# Patient Record
Sex: FEMALE | Race: BLACK OR AFRICAN AMERICAN
[De-identification: names, ages, dates, MRNs, and addresses within clinical notes are randomized per-mention and may not be internally consistent; named-entity substitution may affect disease eponyms.]

---

## 2017-01-17 NOTE — PHYS DOC
Past Medical History


Past Medical History:  Anemia


Past Surgical History:  No Surgical History


Alcohol Use:  None


Drug Use:  None





Adult General


Chief Complaint


Chief Complaint:  DIZZY/LIGHT HEADED





HPI


HPI


Patient is a 21  year old female with a history of anemia who presents today 

with dizziness especially on changing position from laying down to standing 

that began 3 days ago. Patient denies any nausea vomiting, denies any chance 

she is pregnant, she states she uses Depakote shots for birth control and she 

had her last shot 1 month ago. Patient denies any abdominal pain. Denies any 

headache. Denies any fever coughing or congestion.





Review of Systems


Review of Systems





Constitutional: See history of present illness


Eyes: Denies change in visual acuity, redness, or eye pain []


HENT: Denies nasal congestion or sore throat []


Respiratory: Denies cough or shortness of breath []


Cardiovascular: No additional information not addressed in HPI []


GI: Denies any nausea vomiting or diarrhea


Integument: Denies rash or skin lesions []


Neurologic: Dizziness


Endocrine: Denies polyuria or polydipsia []





Current Medications


Current Medications








 Current Medications








 Medications


  (Trade)  Dose


 Ordered  Sig/Andrés  Start Time


 Stop Time Status Last Admin


Dose Admin


 


 Meclizine HCl


  (Antivert)  12.5 mg  1X  ONCE  1/18/17 00:00


 1/18/17 00:01 DC 1/18/17 00:21


12.5 MG


 


 Sodium Chloride


  (Iv Sodium


 Chloride 0.9%


 1000ml Bag)  1,000 ml @ 


 1,000 mls/hr  1X  ONCE  1/18/17 00:00


 1/18/17 00:59  1/18/17 00:20


1,000 MLS/HR














Allergies


Allergies





 Allergies








Coded Allergies Type Severity Reaction Last Updated Verified


 


  No Known Drug Allergies    4/24/14 No











Physical Exam


Physical Exam





Constitutional: Well developed, well nourished, no acute distress, non-toxic 

appearance. []


HENT: Normocephalic, atraumatic, bilateral external ears normal, oropharynx 

moist, no oral exudates, nose normal. []


Eyes: PERRLA, EOMI, conjunctiva normal, no discharge. [] 


Neck: Normal range of motion, no tenderness, supple, no stridor. [] 


Cardiovascular:Heart rate regular rhythm, no murmur []


Lungs & Thorax:  Bilateral breath sounds clear to auscultation []


Abdomen: Bowel sounds normal, soft, no tenderness, no masses, no pulsatile 

masses. [] 


Skin: Warm, dry, no erythema, no rash. [] 


Back: No tenderness, no CVA tenderness. [] 


Extremities: No tenderness, no cyanosis, no clubbing, ROM intact, no edema. [] 


Neurologic: Alert and oriented X 3, normal motor function, normal sensory 

function, no focal deficits noted. Cranial nerves II through XII intact


Psychologic: Affect normal, judgement normal, mood normal. []





Current Patient Data


Vital Signs








 Vital Signs








  Date Time  Temp Pulse Resp B/P Pulse Ox O2 Delivery O2 Flow Rate FiO2


 


1/18/17 00:02 98.5 75 16 115/68 99 Room Air  





 98.5       














EKG


EKG


[]





Radiology/Procedures


Radiology/Procedures


[]





Course & Med Decision Making


Course & Med Decision Making


Pertinent Labs and Imaging studies reviewed. (See chart for details)





Patient is in the ED with dizziness on changing position that began 3 days ago. 

She is in no distress.  Orthostatics supine blood pressure  115/68, heart rate 

75, sitting blood pressure 109/79 heart rate 85, standing blood pressure 126/79 

heart rate 85. Negative urine hCG.





Chem 8 hemoglobin 12.9, hematocrit 38%, no acute findings on the chem 8. 

Patient is in no distress. Symptoms consistent with vertigo. Instructed to 

change positions very slowly. Instructed push fluids. Instructed to follow-up 

with her own doctor in the next 7 days. Discharged with meclizine. Provided 

return precautions and discharged in stable condition.





Dragon Disclaimer


Dragon Disclaimer


This electronic medical record was generated, in whole or in part, using a 

voice recognition dictation system.





Departure


Departure


Impression:  


 Primary Impression:  


 Vertigo


Disposition:  01 HOME, SELF-CARE


Condition:  STABLE


Referrals:  


NO PCP (PCP)


Follow-up with your own doctor in one week


Patient Instructions:  Vertigo, Easy-to-Read





Additional Instructions:


You were seen for dizziness especially on changing position. We recommend you 

change positions very slowly especially when coming from laying down to 

standing. Ensure you seat fast before you stand. Try and push fluids. Use the 

medications provided as needed. Follow-up with your own doctor in the next 7 

days.


Scripts


Meclizine Hcl 25 Mg Tablet1 Tab PO PRN TID #20 TAB


   Prov:DEVIN TERRY         1/18/17








DEVIN TERRY Jan 17, 2017 23:53

## 2017-10-21 NOTE — PHYS DOC
Past Medical History


Past Medical History:  Anemia


Past Surgical History:  No Surgical History


Alcohol Use:  None


Drug Use:  None





Adult General


Chief Complaint


Chief Complaint:  PELVIC PAIN





HPI


HPI





Patient is a 21  year old female who presents stating "I am bleeding down there 

when I pee" for 3 days. Patient states the vaginal area is irritated. Patient 

denies any concerns for STDs. Denies any chance she is pregnant. Denies any 

abdominal pain nausea vomiting fever or back pain.





Review of Systems


Review of Systems





Constitutional: Denies fever or chills []


Eyes: Denies change in visual acuity, redness, or eye pain []


HENT: Denies nasal congestion or sore throat []


Respiratory: Denies cough or shortness of breath []


Cardiovascular: No additional information not addressed in HPI []


GI: Denies abdominal pain, nausea, vomiting, bloody stools or diarrhea []


: Dysuria and hematuria, vaginal irritation


Musculoskeletal: Denies back pain or joint pain []


Integument: Denies rash or skin lesions []


Neurologic: Denies headache, focal weakness or sensory changes []


Endocrine: Denies polyuria or polydipsia []





Allergies


Allergies





Allergies








Coded Allergies Type Severity Reaction Last Updated Verified


 


  No Known Drug Allergies    4/24/14 No











Physical Exam


Physical Exam





Constitutional: Well developed, well nourished, no acute distress, non-toxic 

appearance. []


HENT: Normocephalic, atraumatic, bilateral external ears normal, oropharynx 

moist, no oral exudates, nose normal. []


Eyes: PERRLA, EOMI, conjunctiva normal, no discharge. [] 


Neck: Normal range of motion, no tenderness, supple, no stridor. [] 


Cardiovascular:Heart rate regular rhythm, no murmur []


Lungs & Thorax:  Bilateral breath sounds clear to auscultation []


Abdomen: Bowel sounds normal, soft, no tenderness, no masses, no pulsatile 

masses. [] 


Pelvic exam external pelvic appears normal, cervix is closed, no CMT, no 

adnexal tenderness, small amount of white discharge in the vaginal vault.





Skin: Warm, dry, no erythema, no rash. [] 


Back: No tenderness, no CVA tenderness. [] 


Extremities: No tenderness, no cyanosis, no clubbing, ROM intact, no edema. [] 


Neurologic: Alert and oriented X 3, normal motor function, normal sensory 

function, no focal deficits noted. []


Psychologic: Affect normal, judgement normal, mood normal. []





Current Patient Data


Vital Signs





 Vital Signs








  Date Time  Temp Pulse Resp B/P (MAP) Pulse Ox O2 Delivery O2 Flow Rate FiO2


 


10/21/17 14:14 98.2 72 16  100 Room Air  





 98.2       








Lab Values





 Laboratory Tests








Test


  10/21/17


14:05 10/21/17


14:21


 


Urine Collection Type Void   


 


Urine Color Dk yellow   


 


Urine Clarity Clear   


 


Urine pH 6.0   


 


Urine Specific Gravity 1.025   


 


Urine Protein


  100 mg/dL


(NEG-TRACE) 


 


 


Urine Glucose (UA)


  Negative mg/dL


(NEG) 


 


 


Urine Ketones (Stick)


  Trace mg/dL


(NEG) 


 


 


Urine Blood Large (NEG)   


 


Urine Nitrite


  Negative (NEG)


  


 


 


Urine Bilirubin Small (NEG)   


 


Urine Urobilinogen Dipstick


  1.0 mg/dL (0.2


mg/dL) 


 


 


Urine Leukocyte Esterase


  Moderate (NEG)


  


 


 


Urine RBC


  20-40 /HPF


(0-2) 


 


 


Urine WBC


  20-40 /HPF


(0-4) 


 


 


Urine Squamous Epithelial


Cells Few /LPF  


  


 


 


Urine Bacteria


  Moderate /HPF


(0-FEW) 


 


 


Urine Mucus Marked /LPF   


 


POC Urine HCG, Qualitative


  


  Hcg negative


(Negative)








Microbiology


10/21/17 Wet Prep - Final, Complete


           








EKG


EKG


[]





Radiology/Procedures


Radiology/Procedures


[]





Course & Med Decision Making


Course & Med Decision Making


Pertinent Labs and Imaging studies reviewed. (See chart for details)





Patient is in the ED with vaginal irritation, dysuria and hematuria for 3 days. 

Negative urine hCG, urine positive for UTI, wet prep positive for BV. 

Discharged Flagyl and cephalexin. Instructed to push fluids. Discharged 

Pyridium. Follow-up with PCP in 1-2 weeks.





Dragon Disclaimer


Dragon Disclaimer


This electronic medical record was generated, in whole or in part, using a 

voice recognition dictation system.





Departure


Departure


Impression:  


 Primary Impression:  


 UTI (urinary tract infection)


 Additional Impression:  


 Bacterial vaginosis


Disposition:  01 HOME, SELF-CARE


Condition:  STABLE


Referrals:  


NO PCP (PCP)


follow up in one week


Patient Instructions:  Bacterial Vaginosis, Easy-to-Read, Urinary Tract 

Infection





Additional Instructions:  


You were seen for urinary tract infection and bacterial vaginosis. Take the 

prescribed antibiotics as ordered until completed. Follow-up with your doctor 

in 1-2 weeks. Drink 8 ounces of water every day. Ibuprofen for pain. Take 

Pyridium prescribed for pain. It will make your urine orange but help with the 

pain.


Scripts


Ibuprofen (IBUPROFEN) 600 Mg Tablet


600 MG PO PRN Q6HRS Y for INFLAMMATION, #20 TAB


   Prov: DEVIN TERYR         10/21/17 


Cephalexin (CEPHALEXIN) 500 Mg Tablet


1 TAB PO BID, #14 TAB


   Prov: DEVIN TERRY         10/21/17 


Phenazopyridine Hcl (PYRIDIUM) 100 Mg Tablet


100 MG PO TID, #9 TAB


   Prov: DEVIN TERRY         10/21/17





Problem Qualifiers








 Primary Impression:  


 UTI (urinary tract infection)


 Urinary tract infection type:  acute cystitis  Hematuria presence:  with 

hematuria  Qualified Codes:  N30.01 - Acute cystitis with hematuria








DEVIN TERRY Oct 21, 2017 15:51

## 2017-10-25 NOTE — VNOTE
CALL BACK NOTE


CALL BACK





Microbiology


10/21/17 Wet Prep - Final, Complete


           


10/21/17 Urine Culture - Final, Complete


           


10/21/17 Urine Culture Result 1 (LYNNE) - Final, Complete


           


10/21/17 Urine Culture Result 2 (LYNNE) - Final, Complete


           


Patient is positive for gonorrhea and not treated. Called patient and gave her 

results.  She states she is not sure if she can  afford the medications. 

Informed patient she can return to the ED and be treated. She states she is 

looking for a ride and will be in the ED as soon as she can.











DEVIN TERRY Oct 25, 2017 13:25

## 2017-10-25 NOTE — PHYS DOC
Past Medical History


Past Medical History:  Anemia


Past Surgical History:  No Surgical History


Alcohol Use:  None


Drug Use:  None





Adult General


Chief Complaint


Chief Complaint:  OTHER COMPLAINTS





HPI


HPI





Patient is a 21  year old female presents to the emergency department seeking 

treatment for gonorrhea. Patient was evaluated in the emergency department 

October 21 of 2017. Her gonorrhea culture came back positive. She was notified 

and is here seeking treatment..





Review of Systems


Review of Systems





Constitutional: Denies fever or chills []


Eyes: Denies change in visual acuity, redness, or eye pain []


HENT: Denies nasal congestion or sore throat []


Respiratory: Denies cough or shortness of breath []


Cardiovascular: No additional information not addressed in HPI []


GI: Denies abdominal pain, nausea, vomiting, bloody stools or diarrhea []


: Vaginal irritation without pelvic pain


Musculoskeletal: Denies back pain or joint pain []


Integument: Denies rash or skin lesions []


Neurologic: Denies headache, focal weakness or sensory changes []


Endocrine: Denies polyuria or polydipsia []





Allergies


Allergies





Allergies








Coded Allergies Type Severity Reaction Last Updated Verified


 


  No Known Drug Allergies    4/24/14 No











Physical Exam


Physical Exam





Constitutional: Well developed, well nourished, no acute distress, non-toxic 

appearance. []


Neck: Normal range of motion, no tenderness, supple without lymphadenopathy


Cardiovascular:Heart rate regular rhythm, no murmur []


Lungs & Thorax:  Bilateral breath sounds clear to auscultation []


Abdomen: Bowel sounds normal, soft, no tenderness, no masses, no pulsatile 

masses. [] 


Skin: Warm, dry, no erythema, no rash. [] 


Back: No tenderness, no CVA tenderness. [] 


Extremities: No tenderness, no cyanosis, no clubbing, ROM intact, no edema. [] 


Neurologic: Alert and oriented X 3, normal motor function, normal sensory 

function, no focal deficits noted. []


Psychologic: Affect normal, judgement normal, mood normal. []





EKG


EKG


[]





Radiology/Procedures


Radiology/Procedures


[]





Course & Med Decision Making


Course & Med Decision Making


Pertinent Labs and Imaging studies reviewed. (See chart for details)





[]Lab was reviewed, gonorrhea positive, Chlamydia negative. She was given 

Rocephin 250 mg IM in the emergency department. She was instructed on follow-up 

for her partner as well as abstinence for 10 days after both partners have been 

treated.





Dragon Disclaimer


Dragon Disclaimer


This electronic medical record was generated, in whole or in part, using a 

voice recognition dictation system.





Departure


Departure


Impression:  


 Primary Impression:  


 Gonorrhea


Disposition:  01 HOME, SELF-CARE


Condition:  STABLE


Referrals:  


NO PCP (PCP)








[facility]


Patient Instructions:  Gonorrhea, Females and Males





Additional Instructions:  


Please request that your sexual partners report to the health department for 

evaluation and treatment. No sexual intercourse for 10 days after both partners 

have been treated.











CHRISTINE PENNINGTON APRN Oct 25, 2017 18:07

## 2017-12-03 NOTE — PHYS DOC
Past Medical History


Past Medical History:  Anemia, STD


Past Surgical History:  No Surgical History


Alcohol Use:  None


Drug Use:  None





Adult General


Chief Complaint


Chief Complaint:  VAGINAL PROBLEM





HPI


HPI





Patient is a 22  year old E male presents to the emergency department with a 

four-day history of vaginal itching and burning, thick white vaginal discharge. 

Patient reports she was evaluated at the health Department last week and tested 

for STD.  She states she was told results were negative. She reports she is 

monogamous with 1 partner. Patient received her last Depo-Provera injection one 

month ago.





Review of Systems


Review of Systems





Constitutional: Denies fever or chills []


Eyes: Denies change in visual acuity, redness, or eye pain []


HENT: Denies nasal congestion or sore throat []


Respiratory: Denies cough or shortness of breath []


Cardiovascular: No additional information not addressed in HPI []


GI: Denies abdominal pain, nausea, vomiting, bloody stools or diarrhea []


: Denies dysuria or hematuria , vaginal burning and itching[]


Musculoskeletal: Denies back pain or joint pain []


Integument: Denies rash or skin lesions []


Neurologic: Denies headache, focal weakness or sensory changes []


Endocrine: Denies polyuria or polydipsia []





All other systems were reviewed and found to be within normal limits, except as 

documented in this note.





Allergies


Allergies





Allergies








Coded Allergies Type Severity Reaction Last Updated Verified


 


  No Known Drug Allergies    4/24/14 No











Physical Exam


Physical Exam





Constitutional: Well developed, well nourished, no acute distress, non-toxic 

appearance. []


Abdomen: Bowel sounds normal, soft, no tenderness, no masses, no pulsatile 

masses.


:  deferred [] 


Skin: Warm, dry, no erythema, no rash. [] 


Back: No tenderness, no CVA tenderness. []





EKG


EKG


[]





Radiology/Procedures


Radiology/Procedures


[]





Course & Med Decision Making


Course & Med Decision Making


Pertinent Labs and Imaging studies reviewed. (See chart for details)





[]





Dragon Disclaimer


Dragon Disclaimer


This electronic medical record was generated, in whole or in part, using a 

voice recognition dictation system.





Departure


Departure


Impression:  


 Primary Impression:  


 Vaginitis


Disposition:  01 HOME, SELF-CARE


Condition:  STABLE


Referrals:  


NO PCP (PCP)








[facility]


Patient Instructions:  Vaginitis, Easy-to-Read


Scripts


Fluconazole (DIFLUCAN) 150 Mg Tablet


1 TAB PO ONCE, #1 TAB 1 Refill


   Prov: CHRISTINE PENNINGTON         12/3/17





Problem Qualifiers








 Primary Impression:  


 Vaginitis


 Chronicity:  acute  Qualified Codes:  N76.0 - Acute vaginitis








CHRISTINE PENNINGTON Dec 3, 2017 19:54

## 2018-09-07 NOTE — PHYS DOC
Past Medical History


Past Medical History:  Anemia, STD


Past Surgical History:  No Surgical History


Alcohol Use:  None


Drug Use:  None





Adult General


Chief Complaint


Chief Complaint:  EARACHE/EAR PAIN





HPI


HPI





Patient is a 22  year old female who presents to the ER with complaints of left 

ear pain that started today. Pt states that 2-3 weeks ago she experienced 

similar pain. Currently, she reports her pain as a 4 out of 10 on the pain 

scale. She denies any drainage from the ear, fever, injury to the ear, nausea, 

vomiting, or sore throat. Pt reports runny nose in addition to ear pain.





Review of Systems


Review of Systems





Constitutional: Denies fever or chills []


Eyes: Denies change in visual acuity, redness, or eye pain []


HENT: Denies nasal congestion or sore throat, reports runny nose and left ear 

pain, denies difficulty hearing from L ear or drainage ]


Respiratory: Denies cough or shortness of breath []


Cardiovascular: No additional information not addressed in HPI []


GI: Denies abdominal pain, nausea, or vomiting


Neurologic: Denies headache, focal weakness or sensory changes []





All other systems were reviewed and found to be within normal limits, except as 

documented in this note.





Allergies


Allergies





Allergies








Coded Allergies Type Severity Reaction Last Updated Verified


 


  No Known Drug Allergies    4/24/14 No











Physical Exam


Physical Exam





Constitutional: Well developed, well nourished, no acute distress, non-toxic 

appearance. []


HENT: Normocephalic, atraumatic, bilateral external ears normal, bilateral TMs 

normal. post-nasal drainage present, oropharynx moist, no oral exudates, nasal 

turbinates edematous and erythematous 


Eyes: normal


Neck: Normal range of motion, no tenderness, supple, no stridor. [] 


Cardiovascular:Heart rate regular rhythm, no murmur []


Lungs & Thorax:  Bilateral breath sounds clear to auscultation []


Skin: Warm, dry, no erythema, no rash. [] 


Neurologic: Alert and oriented X 3, normal motor function, normal sensory 

function, no focal deficits noted. []


Psychologic: Affect normal, judgement normal, mood normal. []





Current Patient Data


Vital Signs





 Vital Signs








  Date Time  Temp Pulse Resp B/P (MAP) Pulse Ox O2 Delivery O2 Flow Rate FiO2


 


9/7/18 21:20 99.2 72 16 111/69 (83) 100 Room Air  





 99.2       











EKG


EKG


[]





Radiology/Procedures


Radiology/Procedures


[]





Course & Med Decision Making


Course & Med Decision Making


Pertinent Labs and Imaging studies reviewed. (See chart for details)


Allergic rhinitis and eustachian tube dysfunction. Prescription for flonase 

nasal spray written. Patient verbalized an understanding of home care, 

medications, follow-up, and return to ED instructions and was in agreement with 

the plan of care.


[]





Staff Physician Addendum:


I was working in the ER during the course of this patient's visit.  I was 

available for consultation as needed, but I was not directly involved in the 

care of this patient.





Dragon Disclaimer


Dragon Disclaimer


This electronic medical record was generated, in whole or in part, using a 

voice recognition dictation system.





Departure


Departure


Impression:  


 Primary Impression:  


 Allergic rhinitis


 Additional Impression:  


 Dysfunction of eustachian tube


Disposition:  01 HOME, SELF-CARE


Condition:  STABLE


Referrals:  


NO PCP (PCP)


Patient Instructions:  Allergic Rhinitis





Additional Instructions:  


Fill prescription and use it as directed. Follow-up with her primary care 

doctor in 1-2 days. He can take Tylenol or ibuprofen as needed for pain. Return 

to the ER for symptoms worsen.


Scripts


Fluticasone Propionate (Flonase Allergy Relief) 9.9 Ml Snyder.susp


2 SPRAYS NS DAILY for 14 Days, #1 BOTTLE


   Prov: PAULO BOB         9/7/18





Problem Qualifiers








 Primary Impression:  


 Allergic rhinitis


 Allergic rhinitis trigger:  unspecified  Allergic rhinitis seasonality:  

unspecified  Qualified Codes:  J30.9 - Allergic rhinitis, unspecified


 Additional Impression:  


 Dysfunction of eustachian tube


 Laterality:  left  Qualified Codes:  H69.82 - Other specified disorders of 

eustachian tube, left ear








PAULO BOB APRN Sep 7, 2018 21:51


CHRISTINA MARINO MD Sep 13, 2018 02:17

## 2019-07-31 NOTE — RAD
Exam: Ultrasound biophysical profile

 

Indication: Fall

 

Technique: Real-time grayscale and color Doppler images of the uterus were

obtained by the department sonographer.

 

Comparisons: None

 

FINDINGS:

Single live intrauterine gestation. Fetal heart rate measured at 1 33 bpm

 

Placental location: Fundal

Fetal position: Cephalic

Cervix is not well visualized.

 

BPP:

Fetal breathing movement: 2

Fetal motion: 2

Fetal tone: 2

Amniotic fluid volume: 2

 

TANGELA: 13.4 cm

 

 

IMPRESSION:

1.  Single live intrauterine gestation with Normal biophysical profile.

2.  TANGELA measured at 13:4

 

Electronically signed by: Haroldo An MD (7/31/2019 5:54 PM) [facility]

## 2020-06-23 ENCOUNTER — HOSPITAL ENCOUNTER (EMERGENCY)
Dept: HOSPITAL 61 - ER | Age: 25
Discharge: HOME | End: 2020-06-23
Payer: COMMERCIAL

## 2020-06-23 VITALS — WEIGHT: 101.19 LBS | BODY MASS INDEX: 17.28 KG/M2 | HEIGHT: 64 IN

## 2020-06-23 VITALS — SYSTOLIC BLOOD PRESSURE: 119 MMHG | DIASTOLIC BLOOD PRESSURE: 60 MMHG

## 2020-06-23 DIAGNOSIS — O46.91: ICD-10-CM

## 2020-06-23 DIAGNOSIS — Z3A.01: ICD-10-CM

## 2020-06-23 DIAGNOSIS — O23.41: Primary | ICD-10-CM

## 2020-06-23 LAB
ALBUMIN SERPL-MCNC: 3.5 G/DL (ref 3.4–5)
ALBUMIN/GLOB SERPL: 1 {RATIO} (ref 1–1.7)
ALP SERPL-CCNC: 51 U/L (ref 46–116)
ALT SERPL-CCNC: 20 U/L (ref 14–59)
ANION GAP SERPL CALC-SCNC: 8 MMOL/L (ref 6–14)
APTT PPP: YELLOW S
AST SERPL-CCNC: 15 U/L (ref 15–37)
BACTERIA #/AREA URNS HPF: (no result) /HPF
BASOPHILS # BLD AUTO: 0 X10^3/UL (ref 0–0.2)
BASOPHILS NFR BLD: 1 % (ref 0–3)
BILIRUB SERPL-MCNC: 0.3 MG/DL (ref 0.2–1)
BILIRUB UR QL STRIP: NEGATIVE
BUN SERPL-MCNC: 12 MG/DL (ref 7–20)
BUN/CREAT SERPL: 17 (ref 6–20)
CALCIUM SERPL-MCNC: 8.6 MG/DL (ref 8.5–10.1)
CHLORIDE SERPL-SCNC: 100 MMOL/L (ref 98–107)
CO2 SERPL-SCNC: 27 MMOL/L (ref 21–32)
CREAT SERPL-MCNC: 0.7 MG/DL (ref 0.6–1)
EOSINOPHIL NFR BLD: 0.1 X10^3/UL (ref 0–0.7)
EOSINOPHIL NFR BLD: 1 % (ref 0–3)
ERYTHROCYTE [DISTWIDTH] IN BLOOD BY AUTOMATED COUNT: 13.1 % (ref 11.5–14.5)
FIBRINOGEN PPP-MCNC: CLEAR MG/DL
GFR SERPLBLD BASED ON 1.73 SQ M-ARVRAT: 125.5 ML/MIN
GLOBULIN SER-MCNC: 3.5 G/DL (ref 2.2–3.8)
GLUCOSE SERPL-MCNC: 71 MG/DL (ref 70–99)
HCT VFR BLD CALC: 34.7 % (ref 36–47)
HGB BLD-MCNC: 11.4 G/DL (ref 12–15.5)
LYMPHOCYTES # BLD: 2 X10^3/UL (ref 1–4.8)
LYMPHOCYTES NFR BLD AUTO: 35 % (ref 24–48)
MCH RBC QN AUTO: 28 PG (ref 25–35)
MCHC RBC AUTO-ENTMCNC: 33 G/DL (ref 31–37)
MCV RBC AUTO: 84 FL (ref 79–100)
MONO #: 0.5 X10^3/UL (ref 0–1.1)
MONOCYTES NFR BLD: 9 % (ref 0–9)
NEUT #: 3.1 X10^3/UL (ref 1.8–7.7)
NEUTROPHILS NFR BLD AUTO: 54 % (ref 31–73)
NITRITE UR QL STRIP: NEGATIVE
PH UR STRIP: 6.5 [PH]
PLATELET # BLD AUTO: 298 X10^3/UL (ref 140–400)
POTASSIUM SERPL-SCNC: 4.2 MMOL/L (ref 3.5–5.1)
PROT SERPL-MCNC: 7 G/DL (ref 6.4–8.2)
PROT UR STRIP-MCNC: NEGATIVE MG/DL
PROTHROMBIN TIME: 13.2 SEC (ref 11.7–14)
RBC # BLD AUTO: 4.14 X10^6/UL (ref 3.5–5.4)
RBC #/AREA URNS HPF: (no result) /HPF (ref 0–2)
SODIUM SERPL-SCNC: 135 MMOL/L (ref 136–145)
SQUAMOUS #/AREA URNS LPF: (no result) /LPF
UROBILINOGEN UR-MCNC: 1 MG/DL
WBC # BLD AUTO: 5.7 X10^3/UL (ref 4–11)
WBC #/AREA URNS HPF: (no result) /HPF (ref 0–4)

## 2020-06-23 PROCEDURE — 81001 URINALYSIS AUTO W/SCOPE: CPT

## 2020-06-23 PROCEDURE — 84702 CHORIONIC GONADOTROPIN TEST: CPT

## 2020-06-23 PROCEDURE — 85610 PROTHROMBIN TIME: CPT

## 2020-06-23 PROCEDURE — 99284 EMERGENCY DEPT VISIT MOD MDM: CPT

## 2020-06-23 PROCEDURE — 86900 BLOOD TYPING SEROLOGIC ABO: CPT

## 2020-06-23 PROCEDURE — 36415 COLL VENOUS BLD VENIPUNCTURE: CPT

## 2020-06-23 PROCEDURE — 80053 COMPREHEN METABOLIC PANEL: CPT

## 2020-06-23 PROCEDURE — 81025 URINE PREGNANCY TEST: CPT

## 2020-06-23 PROCEDURE — 86901 BLOOD TYPING SEROLOGIC RH(D): CPT

## 2020-06-23 PROCEDURE — 87491 CHLMYD TRACH DNA AMP PROBE: CPT

## 2020-06-23 PROCEDURE — 87086 URINE CULTURE/COLONY COUNT: CPT

## 2020-06-23 PROCEDURE — 87591 N.GONORRHOEAE DNA AMP PROB: CPT

## 2020-06-23 PROCEDURE — 76801 OB US < 14 WKS SINGLE FETUS: CPT

## 2020-06-23 PROCEDURE — 86850 RBC ANTIBODY SCREEN: CPT

## 2020-06-23 PROCEDURE — 85025 COMPLETE CBC W/AUTO DIFF WBC: CPT

## 2020-06-23 NOTE — PHYS DOC
Past Medical History


Past Medical History:  Anemia, STD


Past Surgical History:  No Surgical History


Smoking Status:  Never Smoker


Alcohol Use:  None


Drug Use:  None





General Adult


EDM:


Chief Complaint:  VAGINAL BLEEDING





HPI:


HPI:





Patient is a 24  year old female who presents with vaginal spotting ,nausea, and

being emotional for 2 days.  Patient reports that she had a positive pregnancy 

test but she wants to confirm her pregnancy.  Patient is unsure when her last 

menstrual period was.  Patient denies any STD concerns and she is reporting a 

clear vaginal discharge that she states is normal for her.  Patient denies any 

abdominal pain.





Review of Systems:


Review of Systems:





GI:   Denies abdominal pain.+ nausea, +vomiting, denies bloody stools. 

+diarrhea. []





Heart Score:


Risk Factors:


Risk Factors:  DM, Current or recent (<one month) smoker, HTN, HLP, family h

istory of CAD, obesity.


Risk Scores:


Score 0 - 3:  2.5% MACE over next 6 weeks - Discharge Home


Score 4 - 6:  20.3% MACE over next 6 weeks - Admit for Clinical Observation


Score 7 - 10:  72.7% MACE over next 6 weeks - Early Invasive Strategies





Allergies:


Allergies:





Allergies








Coded Allergies Type Severity Reaction Last Updated Verified


 


  No Known Drug Allergies    14 No











Physical Exam:


PE:





Constitutional: Well developed, well nourished, no acute distress, non-toxic 

appearance. []


HENT: Normocephalic, atraumatic, bilateral external ears normal, oropharynx 

moist, no oral exudates, nose normal. []


Eyes: PERRLA, EOMI, conjunctiva normal, no discharge. [] 


Neck: Normal range of motion, no tenderness, supple, no stridor. [] 


Cardiovascular:Heart rate regular rhythm, no murmur []


Lungs & Thorax:  Bilateral breath sounds clear to auscultation []


Abdomen: Bowel sounds normal, soft, no tenderness, no masses, no pulsatile 

masses. [] 


Skin: Warm, dry, no erythema, no rash. [] 


Back: No tenderness, no CVA tenderness. [] 


Extremities: No tenderness, no cyanosis, no clubbing, ROM intact, no edema. [] 


Neurologic: Alert and oriented X 3, normal motor function, normal sensory func

tion, no focal deficits noted. []


Psychologic: Affect normal, judgement normal, mood normal. []





**Normal physical exam





EKG:


EKG:


[]





Radiology/Procedures:


Radiology/Procedures:


[]


Impression:


                            [facility]


                    [address] [place] [address] [phone]


                                        


                                 IMAGING REPORT





                                     Signed





PATIENT: COLE DAVIES DACCOUNT: [id number]     MRN#: [id number]


: 1995           LOCATION: ER              AGE: 24


SEX: F                    EXAM DT: 20         ACCESSION#: [id number]


STATUS: REG ER            ORD. PHYSICIAN: LAUREEN HYMAN


REASON: VAGINAL BLEEDING


PROCEDURE: OB < 14 WKS





EXAM: Obstetrics sonogram.


 


HISTORY: Vaginal bleeding.


 


TECHNIQUE: Sonographic imaging of the pelvis was performed.


 


COMPARISON: None.


 


FINDINGS: The uterus measures 10 x 8 x 8 cm. There is an intrauterine 


gestational sac with fetal pole and yolk sac. The fetal crown-rump length 


is 5 mm, corresponding with a gestational age of 6 weeks and 2 days. Fetal


heart rate is normal at 162 bpm. The right ovary is unremarkable. The left


ovary is not seen. There is no pelvic free fluid. The gestational sac is 


normal in configuration and location. There is a suspected small 


subchronic hematoma along the superior aspect of the gestational sac 


measuring 10 mm.


 


IMPRESSION:


1. Single intrauterine fetus with normal heart rate and gestational age of


6 weeks and 2 days.


2. Suspected small subchorionic hematoma.


 


Electronically signed by: Afsaneh Harmon MD (2020 1:35 PM) [id number]














DICTATED and SIGNED BY:     AFSANEH HARMON MD


DATE:     20 1335





Course & Med Decision Making:


Course & Med Decision Making


Pertinent Labs and Imaging studies reviewed. (See chart for details)





Abdomen is soft and nontender.  Urine shows positive pregnancy.  Alert and 

oriented.  Speaks in full clear sentences.  Ambulatory with a steady gait.  Skin

 pink warm and dry.  Afebrile.  Denies dysuria symptoms.  Denies fever.  A 

urinalysis will be performed to check for infection and a pelvic exam will be 

done.  As with all newly pregnant women I will do a pelvic exam and test for 

sexually transmitted diseases.  However patient has no concerns for sexually 

transmitted diseases and refusing treatment today.  Patient is educated that in 

48 hours she will be called only if they are positive.





Pelvic Exam: Chaperone present   


 Abdomen:      Nontender


 External Genitalia:   Normal Skin


 Speculum:      Normal vaginal mucosa, white cervical discharge, Cervical OS 

redness


 Bimanual:       No adnexal masses or tenderness, No CMT











[]





Dragon Disclaimer:


Dragon Disclaimer:


This electronic medical record was generated, in whole or in part, using a voice

 recognition dictation system.





Departure


Departure


Impression:  


   Primary Impression:  


   UTI (urinary tract infection)


   Qualified Codes:  N39.0 - Urinary tract infection, site not specified


   Additional Impression:  


   Vaginal bleeding affecting early pregnancy


Disposition:   HOME, SELF-CARE


Condition:  STABLE


Referrals:  


NO PCP (PCP)








KATHY MCHUGH Jr, MD


Patient Instructions:  Pregnancy - Urinary Tract Infection, Vaginal Bleeding Dur

ing Pregnancy, Easy-to-Read





Additional Instructions:  


Follow-up with your OB doctor as soon as possible.  I also referred you to 1 if 

needed.  Drink plenty of fluids.  Take medication as prescribed and with food.


Scripts


Cephalexin (KEFLEX) 500 Mg Capsule


1 CAP PO BID for 7 Days, #14 CAP 0 Refills


   Prov: LAUREEN HYMAN         20 


Ondansetron (ONDANSETRON ODT) 4 Mg Tab.rapdis


1 TAB PO PRN Q6-8HRS, #16 TAB


   Prov: LAUREEN HYMAN         20





Justicifation of Admission Dx:


Justifications for Admission:


Justification of Admission Dx:  N/A











LAUREEN HYMAN            2020 12:41

## 2020-06-23 NOTE — RAD
EXAM: Obstetrics sonogram.

 

HISTORY: Vaginal bleeding.

 

TECHNIQUE: Sonographic imaging of the pelvis was performed.

 

COMPARISON: None.

 

FINDINGS: The uterus measures 10 x 8 x 8 cm. There is an intrauterine 

gestational sac with fetal pole and yolk sac. The fetal crown-rump length 

is 5 mm, corresponding with a gestational age of 6 weeks and 2 days. Fetal

heart rate is normal at 162 bpm. The right ovary is unremarkable. The left

ovary is not seen. There is no pelvic free fluid. The gestational sac is 

normal in configuration and location. There is a suspected small 

subchronic hematoma along the superior aspect of the gestational sac 

measuring 10 mm.

 

IMPRESSION:

1. Single intrauterine fetus with normal heart rate and gestational age of

6 weeks and 2 days.

2. Suspected small subchorionic hematoma.

 

Electronically signed by: Afsaneh Mcgee MD (6/23/2020 1:35 PM) [id number]